# Patient Record
Sex: MALE | Race: WHITE | NOT HISPANIC OR LATINO | Employment: OTHER | URBAN - METROPOLITAN AREA
[De-identification: names, ages, dates, MRNs, and addresses within clinical notes are randomized per-mention and may not be internally consistent; named-entity substitution may affect disease eponyms.]

---

## 2024-09-20 ENCOUNTER — HOSPITAL ENCOUNTER (INPATIENT)
Facility: HOSPITAL | Age: 69
LOS: 1 days | Discharge: HOME | End: 2024-09-21
Attending: STUDENT IN AN ORGANIZED HEALTH CARE EDUCATION/TRAINING PROGRAM | Admitting: INTERNAL MEDICINE
Payer: MEDICARE

## 2024-09-20 ENCOUNTER — APPOINTMENT (OUTPATIENT)
Dept: CARDIOLOGY | Facility: HOSPITAL | Age: 69
End: 2024-09-20
Payer: MEDICARE

## 2024-09-20 DIAGNOSIS — R00.1 BRADYCARDIA: Primary | ICD-10-CM

## 2024-09-20 PROBLEM — I49.3 ASYMPTOMATIC PVCS: Status: ACTIVE | Noted: 2024-09-20

## 2024-09-20 LAB
ALBUMIN SERPL BCP-MCNC: 4 G/DL (ref 3.4–5)
ALP SERPL-CCNC: 50 U/L (ref 33–136)
ALT SERPL W P-5'-P-CCNC: 18 U/L (ref 10–52)
ANION GAP SERPL CALC-SCNC: 11 MMOL/L (ref 10–20)
AST SERPL W P-5'-P-CCNC: 20 U/L (ref 9–39)
BASOPHILS # BLD AUTO: 0.02 X10*3/UL (ref 0–0.1)
BASOPHILS NFR BLD AUTO: 0.3 %
BILIRUB SERPL-MCNC: 0.5 MG/DL (ref 0–1.2)
BUN SERPL-MCNC: 20 MG/DL (ref 6–23)
CALCIUM SERPL-MCNC: 9 MG/DL (ref 8.6–10.3)
CARDIAC TROPONIN I PNL SERPL HS: 8 NG/L (ref 0–20)
CHLORIDE SERPL-SCNC: 108 MMOL/L (ref 98–107)
CO2 SERPL-SCNC: 23 MMOL/L (ref 21–32)
CREAT SERPL-MCNC: 1.09 MG/DL (ref 0.5–1.3)
EGFRCR SERPLBLD CKD-EPI 2021: 74 ML/MIN/1.73M*2
EOSINOPHIL # BLD AUTO: 0.09 X10*3/UL (ref 0–0.7)
EOSINOPHIL NFR BLD AUTO: 1.3 %
ERYTHROCYTE [DISTWIDTH] IN BLOOD BY AUTOMATED COUNT: 13.2 % (ref 11.5–14.5)
GLUCOSE SERPL-MCNC: 99 MG/DL (ref 74–99)
HCT VFR BLD AUTO: 41.4 % (ref 41–52)
HGB BLD-MCNC: 14.2 G/DL (ref 13.5–17.5)
IMM GRANULOCYTES # BLD AUTO: 0.02 X10*3/UL (ref 0–0.7)
IMM GRANULOCYTES NFR BLD AUTO: 0.3 % (ref 0–0.9)
LYMPHOCYTES # BLD AUTO: 2.09 X10*3/UL (ref 1.2–4.8)
LYMPHOCYTES NFR BLD AUTO: 31.1 %
MAGNESIUM SERPL-MCNC: 1.91 MG/DL (ref 1.6–2.4)
MCH RBC QN AUTO: 31 PG (ref 26–34)
MCHC RBC AUTO-ENTMCNC: 34.3 G/DL (ref 32–36)
MCV RBC AUTO: 90 FL (ref 80–100)
MONOCYTES # BLD AUTO: 0.58 X10*3/UL (ref 0.1–1)
MONOCYTES NFR BLD AUTO: 8.6 %
NEUTROPHILS # BLD AUTO: 3.93 X10*3/UL (ref 1.2–7.7)
NEUTROPHILS NFR BLD AUTO: 58.4 %
NRBC BLD-RTO: 0 /100 WBCS (ref 0–0)
PLATELET # BLD AUTO: 163 X10*3/UL (ref 150–450)
POTASSIUM SERPL-SCNC: 3.9 MMOL/L (ref 3.5–5.3)
PROT SERPL-MCNC: 6.7 G/DL (ref 6.4–8.2)
RBC # BLD AUTO: 4.58 X10*6/UL (ref 4.5–5.9)
SODIUM SERPL-SCNC: 138 MMOL/L (ref 136–145)
TSH SERPL-ACNC: 1.7 MIU/L (ref 0.44–3.98)
WBC # BLD AUTO: 6.7 X10*3/UL (ref 4.4–11.3)

## 2024-09-20 PROCEDURE — 80053 COMPREHEN METABOLIC PANEL: CPT

## 2024-09-20 PROCEDURE — 84443 ASSAY THYROID STIM HORMONE: CPT

## 2024-09-20 PROCEDURE — 36415 COLL VENOUS BLD VENIPUNCTURE: CPT

## 2024-09-20 PROCEDURE — 84484 ASSAY OF TROPONIN QUANT: CPT

## 2024-09-20 PROCEDURE — 85025 COMPLETE CBC W/AUTO DIFF WBC: CPT

## 2024-09-20 PROCEDURE — 2500000004 HC RX 250 GENERAL PHARMACY W/ HCPCS (ALT 636 FOR OP/ED): Performed by: PHYSICIAN ASSISTANT

## 2024-09-20 PROCEDURE — 99285 EMERGENCY DEPT VISIT HI MDM: CPT

## 2024-09-20 PROCEDURE — G0378 HOSPITAL OBSERVATION PER HR: HCPCS

## 2024-09-20 PROCEDURE — 93005 ELECTROCARDIOGRAM TRACING: CPT

## 2024-09-20 PROCEDURE — 96372 THER/PROPH/DIAG INJ SC/IM: CPT | Performed by: PHYSICIAN ASSISTANT

## 2024-09-20 PROCEDURE — 83735 ASSAY OF MAGNESIUM: CPT

## 2024-09-20 RX ORDER — ENOXAPARIN SODIUM 100 MG/ML
40 INJECTION SUBCUTANEOUS EVERY 24 HOURS
Status: DISCONTINUED | OUTPATIENT
Start: 2024-09-20 | End: 2024-09-21 | Stop reason: HOSPADM

## 2024-09-20 RX ORDER — ACETAMINOPHEN 160 MG/5ML
650 SOLUTION ORAL EVERY 4 HOURS PRN
Status: DISCONTINUED | OUTPATIENT
Start: 2024-09-20 | End: 2024-09-21 | Stop reason: HOSPADM

## 2024-09-20 RX ORDER — MULTIVIT-MIN/IRON FUM/FOLIC AC 7.5 MG-4
1 TABLET ORAL DAILY
COMMUNITY

## 2024-09-20 RX ORDER — TALC
3 POWDER (GRAM) TOPICAL NIGHTLY PRN
Status: DISCONTINUED | OUTPATIENT
Start: 2024-09-20 | End: 2024-09-21 | Stop reason: HOSPADM

## 2024-09-20 RX ORDER — ACETAMINOPHEN 325 MG/1
650 TABLET ORAL EVERY 4 HOURS PRN
Status: DISCONTINUED | OUTPATIENT
Start: 2024-09-20 | End: 2024-09-21 | Stop reason: HOSPADM

## 2024-09-20 RX ORDER — METOPROLOL SUCCINATE 50 MG/1
50 TABLET, EXTENDED RELEASE ORAL DAILY
COMMUNITY
Start: 2024-07-17 | End: 2024-09-21 | Stop reason: HOSPADM

## 2024-09-20 RX ORDER — ACETAMINOPHEN 650 MG/1
650 SUPPOSITORY RECTAL EVERY 4 HOURS PRN
Status: DISCONTINUED | OUTPATIENT
Start: 2024-09-20 | End: 2024-09-21 | Stop reason: HOSPADM

## 2024-09-20 RX ORDER — ASCORBIC ACID 500 MG
500 TABLET ORAL DAILY
COMMUNITY

## 2024-09-20 RX ADMIN — ENOXAPARIN SODIUM 40 MG: 40 INJECTION SUBCUTANEOUS at 17:40

## 2024-09-20 SDOH — SOCIAL STABILITY: SOCIAL INSECURITY: DO YOU FEEL UNSAFE GOING BACK TO THE PLACE WHERE YOU ARE LIVING?: NO

## 2024-09-20 SDOH — SOCIAL STABILITY: SOCIAL INSECURITY: HAS ANYONE EVER THREATENED TO HURT YOUR FAMILY OR YOUR PETS?: NO

## 2024-09-20 SDOH — SOCIAL STABILITY: SOCIAL INSECURITY: DO YOU FEEL ANYONE HAS EXPLOITED OR TAKEN ADVANTAGE OF YOU FINANCIALLY OR OF YOUR PERSONAL PROPERTY?: NO

## 2024-09-20 SDOH — SOCIAL STABILITY: SOCIAL INSECURITY: ABUSE: ADULT

## 2024-09-20 SDOH — SOCIAL STABILITY: SOCIAL INSECURITY: HAVE YOU HAD ANY THOUGHTS OF HARMING ANYONE ELSE?: NO

## 2024-09-20 SDOH — SOCIAL STABILITY: SOCIAL INSECURITY: WERE YOU ABLE TO COMPLETE ALL THE BEHAVIORAL HEALTH SCREENINGS?: YES

## 2024-09-20 SDOH — SOCIAL STABILITY: SOCIAL INSECURITY: HAVE YOU HAD THOUGHTS OF HARMING ANYONE ELSE?: NO

## 2024-09-20 SDOH — SOCIAL STABILITY: SOCIAL INSECURITY: DOES ANYONE TRY TO KEEP YOU FROM HAVING/CONTACTING OTHER FRIENDS OR DOING THINGS OUTSIDE YOUR HOME?: NO

## 2024-09-20 SDOH — SOCIAL STABILITY: SOCIAL INSECURITY: ARE YOU OR HAVE YOU BEEN THREATENED OR ABUSED PHYSICALLY, EMOTIONALLY, OR SEXUALLY BY ANYONE?: NO

## 2024-09-20 SDOH — SOCIAL STABILITY: SOCIAL INSECURITY: ARE THERE ANY APPARENT SIGNS OF INJURIES/BEHAVIORS THAT COULD BE RELATED TO ABUSE/NEGLECT?: NO

## 2024-09-20 ASSESSMENT — ACTIVITIES OF DAILY LIVING (ADL)
GROOMING: INDEPENDENT
DRESSING YOURSELF: INDEPENDENT
FEEDING YOURSELF: INDEPENDENT
HEARING - LEFT EAR: FUNCTIONAL
BATHING: INDEPENDENT
TOILETING: INDEPENDENT
PATIENT'S MEMORY ADEQUATE TO SAFELY COMPLETE DAILY ACTIVITIES?: YES
HEARING - RIGHT EAR: FUNCTIONAL
JUDGMENT_ADEQUATE_SAFELY_COMPLETE_DAILY_ACTIVITIES: YES
ADEQUATE_TO_COMPLETE_ADL: YES
WALKS IN HOME: INDEPENDENT
LACK_OF_TRANSPORTATION: NO

## 2024-09-20 ASSESSMENT — ENCOUNTER SYMPTOMS
DIZZINESS: 0
SORE THROAT: 0
CONFUSION: 0
NAUSEA: 0
ABDOMINAL PAIN: 0
CHILLS: 0
WHEEZING: 0
DYSURIA: 0
VOMITING: 0
FEVER: 0
SHORTNESS OF BREATH: 0
DIAPHORESIS: 0
PALPITATIONS: 0
HEADACHES: 0
CHEST TIGHTNESS: 0

## 2024-09-20 ASSESSMENT — PATIENT HEALTH QUESTIONNAIRE - PHQ9
1. LITTLE INTEREST OR PLEASURE IN DOING THINGS: NOT AT ALL
2. FEELING DOWN, DEPRESSED OR HOPELESS: NOT AT ALL
SUM OF ALL RESPONSES TO PHQ9 QUESTIONS 1 & 2: 0

## 2024-09-20 ASSESSMENT — COGNITIVE AND FUNCTIONAL STATUS - GENERAL
MOBILITY SCORE: 24
DAILY ACTIVITIY SCORE: 24
PATIENT BASELINE BEDBOUND: NO
DAILY ACTIVITIY SCORE: 24
MOBILITY SCORE: 24

## 2024-09-20 ASSESSMENT — LIFESTYLE VARIABLES
HOW OFTEN DO YOU HAVE 6 OR MORE DRINKS ON ONE OCCASION: NEVER
HAVE PEOPLE ANNOYED YOU BY CRITICIZING YOUR DRINKING: NO
SKIP TO QUESTIONS 9-10: 1
AUDIT-C TOTAL SCORE: 0
EVER FELT BAD OR GUILTY ABOUT YOUR DRINKING: NO
AUDIT-C TOTAL SCORE: 0
HAVE YOU EVER FELT YOU SHOULD CUT DOWN ON YOUR DRINKING: NO
SUBSTANCE_ABUSE_PAST_12_MONTHS: NO
HOW OFTEN DO YOU HAVE A DRINK CONTAINING ALCOHOL: NEVER
HOW MANY STANDARD DRINKS CONTAINING ALCOHOL DO YOU HAVE ON A TYPICAL DAY: PATIENT DOES NOT DRINK
TOTAL SCORE: 0
PRESCIPTION_ABUSE_PAST_12_MONTHS: NO
EVER HAD A DRINK FIRST THING IN THE MORNING TO STEADY YOUR NERVES TO GET RID OF A HANGOVER: NO

## 2024-09-20 ASSESSMENT — PAIN - FUNCTIONAL ASSESSMENT
PAIN_FUNCTIONAL_ASSESSMENT: 0-10
PAIN_FUNCTIONAL_ASSESSMENT: 0-10

## 2024-09-20 ASSESSMENT — PAIN SCALES - GENERAL
PAINLEVEL_OUTOF10: 0 - NO PAIN
PAINLEVEL_OUTOF10: 0 - NO PAIN

## 2024-09-20 ASSESSMENT — COLUMBIA-SUICIDE SEVERITY RATING SCALE - C-SSRS
1. IN THE PAST MONTH, HAVE YOU WISHED YOU WERE DEAD OR WISHED YOU COULD GO TO SLEEP AND NOT WAKE UP?: NO
2. HAVE YOU ACTUALLY HAD ANY THOUGHTS OF KILLING YOURSELF?: NO
6. HAVE YOU EVER DONE ANYTHING, STARTED TO DO ANYTHING, OR PREPARED TO DO ANYTHING TO END YOUR LIFE?: NO

## 2024-09-20 NOTE — DISCHARGE INSTRUCTIONS
If you start to experience chest pain, shortness of breath, fevers, severe headache, changes in vision, weakness, difficulty talking, vomiting or any new or concerning symptoms you should go to your closest emergency department.

## 2024-09-20 NOTE — ED PROVIDER NOTES
History of Present Illness     History provided by: Patient  Limitations to History: None  External Records Reviewed with Brief Summary: No external records available for review. I reviewed the [facility]    HPI:  Josep Lilly is a 69 y.o. male with a past medical hx of atrial fibrillation s/p ablation not on any blood thinners [facility] after abnormal pulse was noted during routine check up. Patient's pulse was in the 30s per report but patient denies any symptoms. States he otherwise feels well and would not have presented if he wasn't informed. Denies any falls, syncopal episodes, irregular heart rate sensations, chest pain, sob, or any other symptoms at this time. States he takes 100mg of metoprolol which he has been taking for atrial fibrillation prior to ablation, had a discussion with new PCP about discontinuing however took last dose this morning. Denies any missed doses or taking an additional dose. Uses a CPAP for sleep apnea. No other associated signs or symptoms reported at this time.    Physical Exam   Triage vitals:  T 36.5 °C (97.7 °F)  HR 55  /70  RR 16  O2 97 % None (Room air)    Physical Exam  Vitals and nursing note reviewed.   Constitutional:       General: He is not in acute distress.     Appearance: Normal appearance. He is not toxic-appearing.   HENT:      Head: Normocephalic and atraumatic.      Mouth/Throat:      Mouth: Mucous membranes are moist.   Eyes:      General: No scleral icterus.     Conjunctiva/sclera: Conjunctivae normal.   Cardiovascular:      Rate and Rhythm: Regular rhythm. Bradycardia present.      Comments: Occasional pauses palpitated on palpation of the radial pulse. 2+ (irregular)  Pulmonary:      Effort: Pulmonary effort is normal. No respiratory distress.      Breath sounds: Normal breath sounds.   Abdominal:      General: Bowel sounds are normal. There is no distension.      Palpations: Abdomen is soft.       Tenderness: There is no abdominal tenderness.   Musculoskeletal:         General: Normal range of motion.      Cervical back: Normal range of motion and neck supple.   Skin:     General: Skin is warm and dry.   Neurological:      General: No focal deficit present.      Mental Status: He is alert.   Psychiatric:         Mood and Affect: Mood normal.         Behavior: Behavior normal.         Thought Content: Thought content normal.         Judgment: Judgment normal.          Medical Decision Making & ED Course   Medical Decision Makin y.o. male with the above past medical hx presenting to the ED with concerns for bradycardia. Patient's work up in addition to his observation and monitoring in the ED has otherwise been reassuring. Despite this our recommendations for further evaluation and cardiac work up was for admission. Patient has only had EKGs done but denies any cardiac work up either at the [facility] or [facility] where his cardiologist is. He has a cardiologist appointment in 2025 and was attempting to reschedule for sooner while in the ED but was on hold for too long so he hung up on them. We did recommend admission for further evaluation however patient refused stating he felt fine going in to the appointment earlier today and he feels fine now. He also has plans and things to take care of today and does not want to be admitted for further evaluation. He was told by his primary care doctor to stop taking metoprolol completely until evaluated by his cardiologist however I did consult/speak to cardiology here to see if they had any further recommendations. They recommended he hold off on his metoprolol as well and follow up outpatient in the next 1-2 weeks. However patient is unable to follow up outpatient with cardiologist until Dec/2024 and due to this is now wanting to be admitted for further work up and evaluation. Admitted in stable condition.    ----    Differential diagnoses  considered include but are not limited to: bradycardia, heart block, BBB overdose, electrolyte abnormalities.      Social Determinants of Health which Significantly Impact Care: None identified     EKG Independent Interpretation: See ED Course    Independent Result Review and Interpretation: See ED course    Chronic conditions affecting the patient's care: See HPI    The patient was discussed with the following consultants/services:  Spoke to Cardiology in addition to Dr. Lui who accepted the patient for admission    Care Considerations: See MDM    ED Course:  ED Course as of 09/20/24 1800   Fri Sep 20, 2024   1303 ECG 12 Lead  EKG performed at 1258 showed sinus bradycardia with a rate of 56.  There were frequent PVCs.  No ST elevation MI. [KD]   1426 Spoke to Dr. Rodriguez who recommended patient hold his beta blocker and schedule a cardiologist appointment in the next 1-2 weeks as he is asymptomatic. Patient sees cardiologist at [facility] and prefers to stay there for cardiac work up and evaluation. After reevaluation patient noted he was unable to get an appointment sooner with his own cardiologist and wanted to stay here for further evaluation [KD]      ED Course User Index  [KD] Cathy Bardales DO         Diagnoses as of 09/20/24 1800   Bradycardia     Disposition   Admitted    Seen and discussed with ED Attending  Cathy Bardales DO, PGY-2  Emergency Medicine     Cathy Bardales DO  Resident  09/20/24 1447       Cathy Bardales DO  Resident  09/20/24 1800

## 2024-09-20 NOTE — H&P
History Of Present Illness  Josep Lilly is a 69 y.o. male with PMH of AF treated with ablation, hx of hep C, and OREN (compliant with CPAP) presented to [facility] ED via [facility] for low heart rate.  Patient states that he was at his PCPs office for a regular checkup.  When he went to check his vital signs, they noted his heart rate to be in the 30s.  Patient was, completely asymptomatic of this heart rate.  Patient denies headache, dizziness, chest pain, heart palpitations, near-syncope, shortness of breath, or dyspnea on exertion. He has had no issues recently with fatigue or stamina and is able to do all the activities he wishes to. He takes Toprol-XL 50 mg daily, which he was advised to continue taking after his ablation.  He was also advised to continue to monitor his heart rate immediately after the ablation, and he reports his heart rate typically runs in the 50s.  He was more recently told by his PCP to stop taking Toprol but wanted to wait until he can see a cardiologist before he stopped taking it. He was initially going to be discharged from the ER with instruction to see a cardiologist in the next 1 to 2 weeks.  Patient prefers to see [facility] cardiologist, but the patient is unable to get in with a [facility] cardiologist until at least December or January.  Patient requested hospital stay in order to see cardiology in a more timely manner.  The patient has been accepted under the service of Dr. Lui with consultation to cardiology.      Past Medical History  Past Medical History:   Diagnosis Date    Hepatitis C     Hypertension     OREN (obstructive sleep apnea)     S/P ablation of atrial fibrillation        Surgical History  Past Surgical History:   Procedure Laterality Date    CARDIAC ELECTROPHYSIOLOGY MAPPING AND ABLATION      COLONOSCOPY      SHOULDER ARTHROSCOPY Bilateral         Social History  Social History     Tobacco Use    Smoking status: Former     Types: Cigarettes     Tobacco comments:     Quit in 2010.  Smoked 1 pack/day x 35 years.   Substance Use Topics    Alcohol use: Not Currently    Drug use: Not Currently        Family History  Family History   Problem Relation Name Age of Onset    Thyroid disease Mother      Heart disease Neg Hx          No Fhx of heart arrhythmias or family members requiring requiring pacemakers        Allergies  Patient has no known allergies.    Review of Systems   Constitutional:  Negative for chills, diaphoresis and fever.   HENT:  Negative for congestion and sore throat.    Eyes:  Negative for visual disturbance.   Respiratory:  Negative for chest tightness, shortness of breath and wheezing.    Cardiovascular:  Negative for chest pain, palpitations and leg swelling.   Gastrointestinal:  Negative for abdominal pain, nausea and vomiting.   Genitourinary:  Negative for dysuria.   Neurological:  Negative for dizziness, syncope and headaches.   Psychiatric/Behavioral:  Negative for confusion.      Physical Exam  Constitutional:       Appearance: Normal appearance. He is normal weight.   HENT:      Head: Normocephalic and atraumatic.      Mouth/Throat:      Mouth: Mucous membranes are moist.   Eyes:      Conjunctiva/sclera: Conjunctivae normal.   Cardiovascular:      Rate and Rhythm: Bradycardia present. Rhythm irregular.      Heart sounds: Murmur heard.   Pulmonary:      Effort: No respiratory distress.      Breath sounds: No wheezing, rhonchi or rales.   Abdominal:      General: There is no distension.      Tenderness: There is no abdominal tenderness. There is no guarding.   Musculoskeletal:         General: No deformity.      Cervical back: No rigidity.   Skin:     General: Skin is warm.   Neurological:      General: No focal deficit present.      Mental Status: He is alert and oriented to person, place, and time.   Psychiatric:         Mood and Affect: Mood normal.       Last Recorded Vitals  Visit Vitals  /57   Pulse 52   Temp 36.5 °C (97.7  °F) (Tympanic)   Resp 16   Ht 1.829 m (6')   Wt 108 kg (237 lb)   SpO2 100%   BMI 32.14 kg/m²   Smoking Status Former   BSA 2.34 m²        Relevant Results  Results for orders placed or performed during the hospital encounter of 09/20/24 (from the past 24 hour(s))   CBC and Auto Differential   Result Value Ref Range    WBC 6.7 4.4 - 11.3 x10*3/uL    nRBC 0.0 0.0 - 0.0 /100 WBCs    RBC 4.58 4.50 - 5.90 x10*6/uL    Hemoglobin 14.2 13.5 - 17.5 g/dL    Hematocrit 41.4 41.0 - 52.0 %    MCV 90 80 - 100 fL    MCH 31.0 26.0 - 34.0 pg    MCHC 34.3 32.0 - 36.0 g/dL    RDW 13.2 11.5 - 14.5 %    Platelets 163 150 - 450 x10*3/uL    Neutrophils % 58.4 40.0 - 80.0 %    Immature Granulocytes %, Automated 0.3 0.0 - 0.9 %    Lymphocytes % 31.1 13.0 - 44.0 %    Monocytes % 8.6 2.0 - 10.0 %    Eosinophils % 1.3 0.0 - 6.0 %    Basophils % 0.3 0.0 - 2.0 %    Neutrophils Absolute 3.93 1.20 - 7.70 x10*3/uL    Immature Granulocytes Absolute, Automated 0.02 0.00 - 0.70 x10*3/uL    Lymphocytes Absolute 2.09 1.20 - 4.80 x10*3/uL    Monocytes Absolute 0.58 0.10 - 1.00 x10*3/uL    Eosinophils Absolute 0.09 0.00 - 0.70 x10*3/uL    Basophils Absolute 0.02 0.00 - 0.10 x10*3/uL   Magnesium   Result Value Ref Range    Magnesium 1.91 1.60 - 2.40 mg/dL   Comprehensive metabolic panel   Result Value Ref Range    Glucose 99 74 - 99 mg/dL    Sodium 138 136 - 145 mmol/L    Potassium 3.9 3.5 - 5.3 mmol/L    Chloride 108 (H) 98 - 107 mmol/L    Bicarbonate 23 21 - 32 mmol/L    Anion Gap 11 10 - 20 mmol/L    Urea Nitrogen 20 6 - 23 mg/dL    Creatinine 1.09 0.50 - 1.30 mg/dL    eGFR 74 >60 mL/min/1.73m*2    Calcium 9.0 8.6 - 10.3 mg/dL    Albumin 4.0 3.4 - 5.0 g/dL    Alkaline Phosphatase 50 33 - 136 U/L    Total Protein 6.7 6.4 - 8.2 g/dL    AST 20 9 - 39 U/L    Bilirubin, Total 0.5 0.0 - 1.2 mg/dL    ALT 18 10 - 52 U/L   TSH with reflex to Free T4 if abnormal   Result Value Ref Range    Thyroid Stimulating Hormone 1.70 0.44 - 3.98 mIU/L   Troponin I, High  Sensitivity   Result Value Ref Range    Troponin I, High Sensitivity 8 0 - 20 ng/L        Imaging  No results found.     Home Medications  Prior to Admission medications    Medication Sig Start Date End Date Taking? Authorizing Provider   ascorbic acid (Vitamin C) 500 mg tablet Take 1 tablet (500 mg) by mouth once daily.   Yes Historical Provider, MD   KRILL OIL ORAL Take 1 capsule by mouth once daily in the morning.   Yes Historical Provider, MD   metoprolol succinate XL (Toprol-XL) 50 mg 24 hr tablet Take 1 tablet (50 mg) by mouth once daily. 7/17/24  Yes Historical Provider, MD   multivitamin with minerals tablet Take 1 tablet by mouth once daily.   Yes Historical Provider, MD       Medications  Scheduled medications  enoxaparin, 40 mg, subcutaneous, q24h      Continuous medications     PRN medications  acetaminophen, 650 mg, q4h PRN   Or  acetaminophen, 650 mg, q4h PRN   Or  acetaminophen, 650 mg, q4h PRN  melatonin, 3 mg, Nightly PRN           Assessment/Plan   Assessment & Plan  Sinus bradycardia    Asymptomatic PVCs    Sinus bradycardia  Asymptomatic PVCs  Atrial fibrillation treated with ablation  -HDS  -Monitor on telemetry  -Normotensive with heart rate in the 50s when I am at the bedside  -Will hold Toprol-XL 50 mg  -Cardiology consult  -Ortho VS  -Mg of 1.91 and K of 3.9  -TSH WNL    OREN  -Compliant at home with CPAP which is ordered in house    I spent 40 minutes in the professional and overall care of this patient.     See additional orders for further plan of care.   Further evaluation and management per attending and consulting physicians.      Marylin Bejarano PA-C  [place] [facility] Staff  [id number]

## 2024-09-20 NOTE — CARE PLAN
The patient's goals for the shift include remain hemodynamically stable.     The clinical goals for the shift include remain hemodynamcially stable and maintain safety

## 2024-09-20 NOTE — PROGRESS NOTES
Patient presents today for a follow up from his procedure and of hemorrhoids. He admits about 3 times he has had rectal bleeding when having a bowel movement. He currently admits 1 bowel movement per day. Stools are formed.  He is on Colace 100mg BID.  He also is using the suppositories BID for 10 days.  Hemorrhoids were banded.  Colonoscopy report shows: - Internal hemorrhoids.  Banded. - Diverticulosis in the sigmoid colon and in the descending colon. - The examination was otherwise normal. - Hemorrhoids found on perianal exam. - No specimens collected.  Last visit (11/14/2023): 47 year old male patient presents today for hemorrhoids. Patient states he noticed the hemorrhoids about 10 years ago. He denies any rectal pain or pruritus ani. He admits the hemorrhoids were bleedng on occasion, but the bleeding has resolved. Patient currently admits 1 bowel movement per day, without strain. Stools are formed. He denies diarrhea, and constipation routinely.  Denies any blood, mucus, or melena in stools. He denies trying anything OTC for the hemorrhoids, which he states are internal, but can sometimes come out. Denies completing a colonoscopy in the past. His father had colon cancer diagnosed in his 70s. Pharmacy Medication History Review    Josep Llily is a 69 y.o. male admitted for Sinus bradycardia. Pharmacy reviewed the patient's [id number] medications and allergies for accuracy.    The list below reflectives the updated PTA list. Please review each medication in order reconciliation for additional clarification and justification.  Prior to Admission medications    Medication Sig Start Date End Date Taking? Authorizing Provider   ascorbic acid (Vitamin C) 500 mg tablet Take 1 tablet (500 mg) by mouth once daily.   Yes Historical Provider, MD   KRILL OIL ORAL Take 1 capsule by mouth once daily in the morning.   Yes Historical Provider, MD   metoprolol succinate XL (Toprol-XL) 50 mg 24 hr tablet Take 1 tablet (50 mg) by mouth once daily. 7/17/24  Yes Historical Provider, MD   multivitamin with minerals tablet Take 1 tablet by mouth once daily.   Yes Historical Provider, MD        The list below reflectives the updated allergy list. Please review each documented allergy for additional clarification and justification.  Allergies  Reviewed by Zahra Forbes on 9/20/2024   No Known Allergies         Below are additional concerns with the patient's PTA list.    Patient's Toprol XL dose confirmed as 50 mg daily per [facility].     Zahra Forbes

## 2024-09-21 VITALS
TEMPERATURE: 96.4 F | HEART RATE: 50 BPM | WEIGHT: 237 LBS | SYSTOLIC BLOOD PRESSURE: 135 MMHG | HEIGHT: 72 IN | RESPIRATION RATE: 16 BRPM | DIASTOLIC BLOOD PRESSURE: 66 MMHG | OXYGEN SATURATION: 94 % | BODY MASS INDEX: 32.1 KG/M2

## 2024-09-21 LAB
ANION GAP SERPL CALC-SCNC: 10 MMOL/L (ref 10–20)
BUN SERPL-MCNC: 20 MG/DL (ref 6–23)
CALCIUM SERPL-MCNC: 9.1 MG/DL (ref 8.6–10.3)
CHLORIDE SERPL-SCNC: 108 MMOL/L (ref 98–107)
CO2 SERPL-SCNC: 26 MMOL/L (ref 21–32)
CREAT SERPL-MCNC: 1.2 MG/DL (ref 0.5–1.3)
EGFRCR SERPLBLD CKD-EPI 2021: 66 ML/MIN/1.73M*2
ERYTHROCYTE [DISTWIDTH] IN BLOOD BY AUTOMATED COUNT: 13.5 % (ref 11.5–14.5)
GLUCOSE SERPL-MCNC: 86 MG/DL (ref 74–99)
HCT VFR BLD AUTO: 43.9 % (ref 41–52)
HGB BLD-MCNC: 14.7 G/DL (ref 13.5–17.5)
MCH RBC QN AUTO: 30.7 PG (ref 26–34)
MCHC RBC AUTO-ENTMCNC: 33.5 G/DL (ref 32–36)
MCV RBC AUTO: 92 FL (ref 80–100)
NRBC BLD-RTO: 0 /100 WBCS (ref 0–0)
PLATELET # BLD AUTO: 178 X10*3/UL (ref 150–450)
POTASSIUM SERPL-SCNC: 4 MMOL/L (ref 3.5–5.3)
RBC # BLD AUTO: 4.79 X10*6/UL (ref 4.5–5.9)
SODIUM SERPL-SCNC: 140 MMOL/L (ref 136–145)
WBC # BLD AUTO: 6.5 X10*3/UL (ref 4.4–11.3)

## 2024-09-21 PROCEDURE — 36415 COLL VENOUS BLD VENIPUNCTURE: CPT | Performed by: PHYSICIAN ASSISTANT

## 2024-09-21 PROCEDURE — 1200000002 HC GENERAL ROOM WITH TELEMETRY DAILY

## 2024-09-21 PROCEDURE — 99222 1ST HOSP IP/OBS MODERATE 55: CPT | Performed by: STUDENT IN AN ORGANIZED HEALTH CARE EDUCATION/TRAINING PROGRAM

## 2024-09-21 PROCEDURE — 80048 BASIC METABOLIC PNL TOTAL CA: CPT | Performed by: PHYSICIAN ASSISTANT

## 2024-09-21 PROCEDURE — 85027 COMPLETE CBC AUTOMATED: CPT | Performed by: PHYSICIAN ASSISTANT

## 2024-09-21 PROCEDURE — 94660 CPAP INITIATION&MGMT: CPT

## 2024-09-21 PROCEDURE — G0378 HOSPITAL OBSERVATION PER HR: HCPCS

## 2024-09-21 ASSESSMENT — PAIN SCALES - GENERAL: PAINLEVEL_OUTOF10: 0 - NO PAIN

## 2024-09-21 ASSESSMENT — COGNITIVE AND FUNCTIONAL STATUS - GENERAL
DAILY ACTIVITIY SCORE: 24
MOBILITY SCORE: 24

## 2024-09-21 ASSESSMENT — ENCOUNTER SYMPTOMS
DYSPNEA ON EXERTION: 0
CONSTITUTIONAL NEGATIVE: 1
ALLERGIC/IMMUNOLOGIC NEGATIVE: 1
EYES NEGATIVE: 1
NEUROLOGICAL NEGATIVE: 1
SYNCOPE: 0
RESPIRATORY NEGATIVE: 1
HEMATOLOGIC/LYMPHATIC NEGATIVE: 1
MUSCULOSKELETAL NEGATIVE: 1
ORTHOPNEA: 0
ENDOCRINE NEGATIVE: 1
PSYCHIATRIC NEGATIVE: 1
PALPITATIONS: 0
NEAR-SYNCOPE: 0
PND: 0
GASTROINTESTINAL NEGATIVE: 1

## 2024-09-21 NOTE — PROGRESS NOTES
09/21/24 0938   Discharge Planning   Living Arrangements Alone   Assistance Needed Independent, patient does drive   Type of Residence Private residence   Home or Post Acute Services None   Expected Discharge Disposition Home     Met with patient at bedside, introduced self and role on care transitions team. Admission assessment completed with patient. Address, insurance and contact information verified. Patient lives at home. Patient wears cpap at night. Patient preference is to return home at discharge. Patient has declined any home going needs.  [facility] was yesterday  Pharmacy: [facility] on [address]

## 2024-09-21 NOTE — CARE PLAN
Problem: Pain - Adult  Goal: Verbalizes/displays adequate comfort level or baseline comfort level  Outcome: Progressing     Problem: Safety - Adult  Goal: Free from fall injury  Outcome: Progressing     Problem: Chronic Conditions and Co-morbidities  Goal: Patient's chronic conditions and co-morbidity symptoms are monitored and maintained or improved  Outcome: Progressing     Problem: Discharge Planning  Goal: Discharge to home or other facility with appropriate resources  Outcome: Progressing

## 2024-09-21 NOTE — CARE PLAN
Problem: Pain - Adult  Goal: Verbalizes/displays adequate comfort level or baseline comfort level  9/21/2024 0014 by Anastasia Vergara RN  Outcome: Progressing       Problem: Safety - Adult  Goal: Free from fall injury  9/21/2024 0014 by Anastasia Vergara RN  Outcome: Progressing     The patient's goals for the shift include      The clinical goals for the shift include to remain HD stable

## 2024-09-21 NOTE — CONSULTS
Cardiology Consultation- New Consult    Inpatient consult to Cardiology  Consult performed by: Albert Rodriguez MD  Consult ordered by: Marylin Bejarano PA-C        HPI: Josep Lilly is a 69 y.o.  male who [facility]ady[facility]. Past medical history of paroxysmal atrial fibrillation status post ablation, hypertension, OREN, history of hepatitis C.    Patient was at his PCPs office for a regular checkup when he was noted to have bradycardia with heart rate in the upper 30s.  Patient was asymptomatic.  Patient directed to the emergency room for further evaluation.  Of note patient was on metoprolol XL 50 mg daily. In the ED, EKG showed sinus bradycardia without acute ischemic changes.  Cardiology was consulted for further evaluation and treatment.  Recommended holding beta-blockade.      Patient was seen at bedside on 9/21/2024.  Patient denied any chest pain, dyspnea, orthopnea, PND, syncope, presyncope, palpitations, fever/chills, nauseous vomiting, or pedal edema.  Heart rate currently in the 60s, sinus bradycardia per review of telemetry with occasional PVCs.  Patient follows with his cardiologist at [facility] and is not scheduled to follow-up until July 2025.    Past Medical History:   He has a past medical history of Hepatitis C, Hypertension, OREN (obstructive sleep apnea), and S/P ablation of atrial fibrillation.    Surgical History:   He has a past surgical history that includes Shoulder arthroscopy (Bilateral); Colonoscopy; and Cardiac electrophysiology mapping and ablation.    Family History:   Family History   Problem Relation Name Age of Onset    Thyroid disease Mother      Heart disease Neg Hx          No Fhx of heart arrhythmias or family members requiring requiring pacemakers       Allergies:  Patient has no known allergies.     Social History:   -Former smoker; quit in 2010    Prior Cardiovascular Testing (Personally Reviewed):      EKG (9/20/2024)-sinus bradycardia    Review of Systems:  Review of Systems   Constitutional: Negative.   HENT: Negative.     Eyes: Negative.    Cardiovascular:  Negative for chest pain, dyspnea on exertion, near-syncope, orthopnea, palpitations, paroxysmal nocturnal dyspnea and syncope.   Respiratory: Negative.     Endocrine: Negative.    Hematologic/Lymphatic: Negative.    Skin: Negative.    Musculoskeletal: Negative.    Gastrointestinal: Negative.    Genitourinary: Negative.    Neurological: Negative.    Psychiatric/Behavioral: Negative.     Allergic/Immunologic: Negative.        Objective     Outpatient Medications:    Current Facility-Administered Medications:     acetaminophen (Tylenol) tablet 650 mg, 650 mg, oral, q4h PRN **OR** acetaminophen (Tylenol) oral liquid 650 mg, 650 mg, oral, q4h PRN **OR** acetaminophen (Tylenol) suppository 650 mg, 650 mg, rectal, q4h PRN, Marylin Bejarano PA-C    enoxaparin (Lovenox) syringe 40 mg, 40 mg, subcutaneous, q24h, Marylin Bejarano PA-C, 40 mg at 09/20/24 1740    flu vaccine, trivalent, preservative free, HIGH-DOSE, age 65y+ (Fluzone), 0.5 mL, intramuscular, During hospitalization, Sushil Lui MD    melatonin tablet 3 mg, 3 mg, oral, Nightly PRN, Marylin Bejarano PA-C     Inpatient Medications:  Scheduled medications   Medication Dose Route Frequency    enoxaparin  40 mg subcutaneous q24h    influenza  0.5 mL intramuscular During hospitalization       PRN medications   Medication    acetaminophen    Or    acetaminophen    Or    acetaminophen    melatonin       Continuous Medications   Medication Dose Last Rate       Last Recorded Vitals  /66 (BP Location: Left arm, Patient Position: Lying)   Pulse 50   Temp 35.8 °C (96.4 °F) (Temporal)   Resp 16   Ht 1.829 m (6')   Wt 108 kg (237 lb)   SpO2 94%   BMI 32.14 kg/m²     Physical Exam:    Physical Exam  Constitutional:       General: He is not in acute distress.  HENT:      Head:  Normocephalic.      Mouth/Throat:      Mouth: Mucous membranes are moist.   Eyes:      Extraocular Movements: Extraocular movements intact.      Conjunctiva/sclera: Conjunctivae normal.   Neck:      Vascular: No JVD.   Cardiovascular:      Rate and Rhythm: Normal rate and regular rhythm.      Heart sounds: No murmur heard.  Pulmonary:      Effort: Pulmonary effort is normal. No respiratory distress.      Breath sounds: Normal breath sounds.   Abdominal:      General: Bowel sounds are normal. There is no distension.      Palpations: Abdomen is soft.   Musculoskeletal:         General: No swelling.   Skin:     General: Skin is warm and dry.   Neurological:      General: No focal deficit present.      Mental Status: He is alert.      Cranial Nerves: No cranial nerve deficit.      Motor: No weakness.   Psychiatric:         Mood and Affect: Mood normal.         Behavior: Behavior normal.         Intake / Output Summary:     Intake/Output Summary (Last 24 hours) at 9/21/2024 1038  Last data filed at 9/20/2024 2105  Gross per 24 hour   Intake 480 ml   Output --   Net 480 ml       Net IO Since Admission: 480 mL [09/21/24 1038]    Lab/Radiology/Diagnostic Review:    Labs  Results for orders placed or performed during the hospital encounter of 09/20/24 (from the past 24 hour(s))   CBC and Auto Differential   Result Value Ref Range    WBC 6.7 4.4 - 11.3 x10*3/uL    nRBC 0.0 0.0 - 0.0 /100 WBCs    RBC 4.58 4.50 - 5.90 x10*6/uL    Hemoglobin 14.2 13.5 - 17.5 g/dL    Hematocrit 41.4 41.0 - 52.0 %    MCV 90 80 - 100 fL    MCH 31.0 26.0 - 34.0 pg    MCHC 34.3 32.0 - 36.0 g/dL    RDW 13.2 11.5 - 14.5 %    Platelets 163 150 - 450 x10*3/uL    Neutrophils % 58.4 40.0 - 80.0 %    Immature Granulocytes %, Automated 0.3 0.0 - 0.9 %    Lymphocytes % 31.1 13.0 - 44.0 %    Monocytes % 8.6 2.0 - 10.0 %    Eosinophils % 1.3 0.0 - 6.0 %    Basophils % 0.3 0.0 - 2.0 %    Neutrophils Absolute 3.93 1.20 - 7.70 x10*3/uL    Immature Granulocytes  Absolute, Automated 0.02 0.00 - 0.70 x10*3/uL    Lymphocytes Absolute 2.09 1.20 - 4.80 x10*3/uL    Monocytes Absolute 0.58 0.10 - 1.00 x10*3/uL    Eosinophils Absolute 0.09 0.00 - 0.70 x10*3/uL    Basophils Absolute 0.02 0.00 - 0.10 x10*3/uL   Magnesium   Result Value Ref Range    Magnesium 1.91 1.60 - 2.40 mg/dL   Comprehensive metabolic panel   Result Value Ref Range    Glucose 99 74 - 99 mg/dL    Sodium 138 136 - 145 mmol/L    Potassium 3.9 3.5 - 5.3 mmol/L    Chloride 108 (H) 98 - 107 mmol/L    Bicarbonate 23 21 - 32 mmol/L    Anion Gap 11 10 - 20 mmol/L    Urea Nitrogen 20 6 - 23 mg/dL    Creatinine 1.09 0.50 - 1.30 mg/dL    eGFR 74 >60 mL/min/1.73m*2    Calcium 9.0 8.6 - 10.3 mg/dL    Albumin 4.0 3.4 - 5.0 g/dL    Alkaline Phosphatase 50 33 - 136 U/L    Total Protein 6.7 6.4 - 8.2 g/dL    AST 20 9 - 39 U/L    Bilirubin, Total 0.5 0.0 - 1.2 mg/dL    ALT 18 10 - 52 U/L   TSH with reflex to Free T4 if abnormal   Result Value Ref Range    Thyroid Stimulating Hormone 1.70 0.44 - 3.98 mIU/L   Troponin I, High Sensitivity   Result Value Ref Range    Troponin I, High Sensitivity 8 0 - 20 ng/L   CBC   Result Value Ref Range    WBC 6.5 4.4 - 11.3 x10*3/uL    nRBC 0.0 0.0 - 0.0 /100 WBCs    RBC 4.79 4.50 - 5.90 x10*6/uL    Hemoglobin 14.7 13.5 - 17.5 g/dL    Hematocrit 43.9 41.0 - 52.0 %    MCV 92 80 - 100 fL    MCH 30.7 26.0 - 34.0 pg    MCHC 33.5 32.0 - 36.0 g/dL    RDW 13.5 11.5 - 14.5 %    Platelets 178 150 - 450 x10*3/uL   Basic metabolic panel   Result Value Ref Range    Glucose 86 74 - 99 mg/dL    Sodium 140 136 - 145 mmol/L    Potassium 4.0 3.5 - 5.3 mmol/L    Chloride 108 (H) 98 - 107 mmol/L    Bicarbonate 26 21 - 32 mmol/L    Anion Gap 10 10 - 20 mmol/L    Urea Nitrogen 20 6 - 23 mg/dL    Creatinine 1.20 0.50 - 1.30 mg/dL    eGFR 66 >60 mL/min/1.73m*2    Calcium 9.1 8.6 - 10.3 mg/dL       Peripheral IV 09/20/24 18 G Distal;Left;Upper;Ventral Arm (Active)   Placement Date/Time: 09/20/24 1259   Placed by External  Staff?: EMS  Size (Gauge): 18 G  Orientation: Distal;Left;Upper;Ventral  Location: Arm  Technique: Anatomical landmarks  Insertion attempts: 1   Number of days: 0        Troponin I, High Sensitivity   Date/Time Value Ref Range Status   09/20/2024 12:50 PM 8 0 - 20 ng/L Final       Assessment:   69 y.o.  male who [facility]. Past medical history of paroxysmal atrial fibrillation status post ablation, hypertension, OREN, history of hepatitis C.    Patient presented with sinus bradycardia in the setting of beta-blockade.  Interval improvement after holding metoprolol XL 50 mg daily.  Recommend deferring beta-blockade at this time.  Patient remains asymptomatic, okay for discharge home today with outpatient cardiology follow-up in 2 to 4 weeks.    Overall Recommendations:  1.  Sinus bradycardia  - Asymptomatic  - Likely exacerbated by beta-blockade  - Hold metoprolol XL 50 mg daily  - Follow-up in cardiology clinic in 2 to 4 weeks; we will consider outpatient Holter monitor upon follow-up visit    2.  Paroxysmal atrial fibrillation  - Status post ablation at [facility]  - Patient follows with EP at [facility]  - Follow-up as per EP    3.  History of primary hypertension  - If additional antihypertensive therapy needed with then recommend losartan    Disposition-okay for discharge home today with outpatient follow-up with cardiology in 2 to 4 weeks    Thank you for the cardiology consult. We will follow with you.     Albert Rodriguez MD

## 2024-09-21 NOTE — H&P
History Of Present Illness/patient seen and examined by me.  Josep Lilly is a 69 y.o. male with PMH of atrial fibrillation/treated with ablation, hx of hep C, and OREN (compliant with CPAP) who presented to the ER at  [facility] via [facility] for low heart rate/bradycardia.  Patient states that he was at his PCPs office for a regular checkup .  Patient states that when they checked his vital signs they noted his heart rate to be in the 30s.  Patient was, completely asymptomatic of this heart rate.  Patient denies headache, dizziness, chest pain, heart palpitations, near-syncope, shortness of breath, or dyspnea on exertion. He has had no issues recently with fatigue or stamina and is able to do all the activities he wishes to. He takes Toprol-XL 50 mg daily, which he was advised to continue taking after his ablation.  He was also advised to continue to monitor his heart rate immediately after the ablation, and he reports his heart rate typically runs in the 50s.  He was more recently told by his PCP to stop taking Toprol but wanted to wait until he can see a cardiologist before he stopped taking it. He was initially going to be discharged from the ER with instruction to see a cardiologist in the next 1 to 2 weeks.  Patient prefers to see [facility] cardiologist, but the patient is unable to get in with a [facility] cardiologist until at least December or January.  Patient requested hospital stay in order to see cardiology in a more timely manner.  Patient has been admitted under my service for further eval and treatment of his marked sinus bradycardia and his Toprol will be put on hold and cardiology consultation has been obtained.  Potassium is stable at 3.9 on admission and hemoglobin is normal at 14.2 and hematocrit 41.4.  TSH is 1.70.  Troponin is normal.  Patient denies any chest pain or shortness of breath or cough.  He denies any dizziness.  Patient denies any acid reflux or abdominal pain.   Patient denies any constipation diarrhea or melanotic stools.  Patient denies any dysuria.  Past Medical History  Past Medical History:   Diagnosis Date    Hepatitis C     Hypertension     OREN (obstructive sleep apnea)     S/P ablation of atrial fibrillation    Past medical history as above and./History of paroxysmal atrial fibrillation s/p ablation and remains on beta-blockers.    Surgical History  Past Surgical History:   Procedure Laterality Date    CARDIAC ELECTROPHYSIOLOGY MAPPING AND ABLATION      COLONOSCOPY      SHOULDER ARTHROSCOPY Bilateral         Social History  Social History     Tobacco Use    Smoking status: Former     Types: Cigarettes    Tobacco comments:     Quit in 2010.  Smoked 1 pack/day x 35 years.   Substance Use Topics    Alcohol use: Not Currently    Drug use: Not Currently        Family History  Family History   Problem Relation Name Age of Onset    Thyroid disease Mother      Heart disease Neg Hx          No Fhx of heart arrhythmias or family members requiring requiring pacemakers        Allergies  Patient has no known allergies.    Review of Systems   Constitutional:  Negative for chills, diaphoresis and fever.   HENT:  Negative for congestion and sore throat.    Eyes:  Negative for visual disturbance.   Respiratory:  Positive for chest tightness. Negative for shortness of breath and wheezing.    Cardiovascular:  Negative for chest pain, palpitations and leg swelling.   Gastrointestinal:  Negative for abdominal pain, nausea and vomiting.   Genitourinary:  Negative for dysuria.   Neurological:  Negative for dizziness, syncope and headaches.   Psychiatric/Behavioral:  Negative for confusion.      Physical Exam  Constitutional:       Appearance: Normal appearance. He is normal weight.   HENT:      Head: Normocephalic and atraumatic.      Mouth/Throat:      Mouth: Mucous membranes are moist.   Eyes:      Conjunctiva/sclera: Conjunctivae normal.   Cardiovascular:      Rate and Rhythm:  Bradycardia present. Rhythm irregular.      Heart sounds: Murmur heard.   Pulmonary:      Effort: No respiratory distress.      Breath sounds: No wheezing, rhonchi or rales.   Abdominal:      General: There is no distension.      Tenderness: There is no abdominal tenderness. There is no guarding.   Musculoskeletal:         General: No deformity.      Cervical back: No rigidity.   Skin:     General: Skin is warm.   Neurological:      General: No focal deficit present.      Mental Status: He is alert and oriented to person, place, and time.   Psychiatric:         Mood and Affect: Mood normal.       Last Recorded Vitals  Visit Vitals  /61   Pulse 55   Temp 36.5 °C (97.7 °F)   Resp 18   Ht 1.829 m (6')   Wt 108 kg (237 lb)   SpO2 91%   BMI 32.14 kg/m²   Smoking Status Former   BSA 2.34 m²        Relevant Results  Results for orders placed or performed during the hospital encounter of 09/20/24 (from the past 24 hour(s))   CBC and Auto Differential   Result Value Ref Range    WBC 6.7 4.4 - 11.3 x10*3/uL    nRBC 0.0 0.0 - 0.0 /100 WBCs    RBC 4.58 4.50 - 5.90 x10*6/uL    Hemoglobin 14.2 13.5 - 17.5 g/dL    Hematocrit 41.4 41.0 - 52.0 %    MCV 90 80 - 100 fL    MCH 31.0 26.0 - 34.0 pg    MCHC 34.3 32.0 - 36.0 g/dL    RDW 13.2 11.5 - 14.5 %    Platelets 163 150 - 450 x10*3/uL    Neutrophils % 58.4 40.0 - 80.0 %    Immature Granulocytes %, Automated 0.3 0.0 - 0.9 %    Lymphocytes % 31.1 13.0 - 44.0 %    Monocytes % 8.6 2.0 - 10.0 %    Eosinophils % 1.3 0.0 - 6.0 %    Basophils % 0.3 0.0 - 2.0 %    Neutrophils Absolute 3.93 1.20 - 7.70 x10*3/uL    Immature Granulocytes Absolute, Automated 0.02 0.00 - 0.70 x10*3/uL    Lymphocytes Absolute 2.09 1.20 - 4.80 x10*3/uL    Monocytes Absolute 0.58 0.10 - 1.00 x10*3/uL    Eosinophils Absolute 0.09 0.00 - 0.70 x10*3/uL    Basophils Absolute 0.02 0.00 - 0.10 x10*3/uL   Magnesium   Result Value Ref Range    Magnesium 1.91 1.60 - 2.40 mg/dL   Comprehensive metabolic panel   Result  Value Ref Range    Glucose 99 74 - 99 mg/dL    Sodium 138 136 - 145 mmol/L    Potassium 3.9 3.5 - 5.3 mmol/L    Chloride 108 (H) 98 - 107 mmol/L    Bicarbonate 23 21 - 32 mmol/L    Anion Gap 11 10 - 20 mmol/L    Urea Nitrogen 20 6 - 23 mg/dL    Creatinine 1.09 0.50 - 1.30 mg/dL    eGFR 74 >60 mL/min/1.73m*2    Calcium 9.0 8.6 - 10.3 mg/dL    Albumin 4.0 3.4 - 5.0 g/dL    Alkaline Phosphatase 50 33 - 136 U/L    Total Protein 6.7 6.4 - 8.2 g/dL    AST 20 9 - 39 U/L    Bilirubin, Total 0.5 0.0 - 1.2 mg/dL    ALT 18 10 - 52 U/L   TSH with reflex to Free T4 if abnormal   Result Value Ref Range    Thyroid Stimulating Hormone 1.70 0.44 - 3.98 mIU/L   Troponin I, High Sensitivity   Result Value Ref Range    Troponin I, High Sensitivity 8 0 - 20 ng/L        Imaging  No results found.     Home Medications  Prior to Admission medications    Medication Sig Start Date End Date Taking? Authorizing Provider   ascorbic acid (Vitamin C) 500 mg tablet Take 1 tablet (500 mg) by mouth once daily.   Yes Historical Provider, MD   KRILL OIL ORAL Take 1 capsule by mouth once daily in the morning.   Yes Historical Provider, MD   metoprolol succinate XL (Toprol-XL) 50 mg 24 hr tablet Take 1 tablet (50 mg) by mouth once daily. 7/17/24  Yes Historical Provider, MD   multivitamin with minerals tablet Take 1 tablet by mouth once daily.   Yes Historical Provider, MD       Medications  Scheduled medications  enoxaparin, 40 mg, subcutaneous, q24h  influenza, 0.5 mL, intramuscular, During hospitalization      Continuous medications     PRN medications  acetaminophen, 650 mg, q4h PRN   Or  acetaminophen, 650 mg, q4h PRN   Or  acetaminophen, 650 mg, q4h PRN  melatonin, 3 mg, Nightly PRN           Assessment/Plan   Assessment & Plan  Sinus bradycardia    Asymptomatic PVCs    Bradycardia    Sinus bradycardia/asymptomatic/  Asymptomatic PVCs  Atrial fibrillation treated with ablation  -HDS  -Monitor on telemetry  -Normotensive with heart rate in the 50s  when I am at the bedside  -Will hold Toprol-XL 50 mg  -Cardiology consult will be obtained.  -Ortho VS  -Mg of 1.91 and K of 3.9  -TSH WNL    OREN  -Compliant at home with CPAP   Medications reconciled.  Total time spent 40 minutes/time spent on patient interaction/counseling/assessment and plan and documentation.  I spent 40 minutes in the professional and overall care of this patient     Sushil Lui MD

## 2024-09-24 LAB
ATRIAL RATE: 56 BPM
P AXIS: 67 DEGREES
P OFFSET: 183 MS
P ONSET: 124 MS
PR INTERVAL: 184 MS
Q ONSET: 216 MS
QRS COUNT: 8 BEATS
QRS DURATION: 100 MS
QT INTERVAL: 440 MS
QTC CALCULATION(BAZETT): 424 MS
QTC FREDERICIA: 430 MS
R AXIS: 9 DEGREES
T AXIS: 14 DEGREES
T OFFSET: 436 MS
VENTRICULAR RATE: 56 BPM

## 2024-10-04 ENCOUNTER — APPOINTMENT (OUTPATIENT)
Dept: CARDIOLOGY | Facility: CLINIC | Age: 69
End: 2024-10-04
Payer: MEDICARE